# Patient Record
(demographics unavailable — no encounter records)

---

## 2024-10-28 NOTE — ASSESSMENT
[FreeTextEntry1] : 10/28/2024: R SH x-rays, 2 views, reveal negative Underlying pathology reviewed and treatment options discussed. Obtain MRI R SH R/O RTC tear Activity modification as tolerated. Questions addressed. Follow up after MRI.   The documentation recorded by the scribe accurately reflects the service I personally performed and the decisions made by me. I, Urszula Oliveros, attest that this documentation has been prepared under the direction and in the presence of Provider Shaka Collins MD.   The patient was seen by Shaka Collins MD.

## 2024-10-28 NOTE — HISTORY OF PRESENT ILLNESS
[Sudden] : sudden [8] : 8 [1] : 2 [Localized] : localized [Rest] : rest [Retired] : Work status: retired [de-identified] : 10/28/2024: RHD male presents with right shoulder pain that started on 10/25/24. He was doing a bench press when the shoulder "gave out." Notes persistent anterior/lateral shoulder pain with limited ROM. Difficulty lifting above the shoulder or reaching. He has been icing and taking Aleve.  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

## 2024-10-28 NOTE — PHYSICAL EXAM
[Right] : right shoulder [4 ___] : forward flexion 4[unfilled]/5 [5___] : internal rotation 5[unfilled]/5 [] : no sensory deficits [TWNoteComboBox7] : active forward flexion 160 degrees [TWNoteComboBox6] : internal rotation L3 [de-identified] : external rotation 80 degrees

## 2024-11-04 NOTE — DATA REVIEWED
[MRI] : MRI [Right] : of the right [Shoulder] : shoulder [Report was reviewed and noted in the chart] : The report was reviewed and noted in the chart [I independently reviewed and interpreted images and report] : I independently reviewed and interpreted images and report [FreeTextEntry1] : OC MRI R  10/28/24: Mild progression of supraspinatus partial tearing and delamination and mild new partial tearing of the infraspinatus tendon insertion anteriorly without tendon retraction or muscle atrophy. Similar partial tearing of the subscapularis tendon insertion, superior and anterior labral tearing, effusion, synovitis and capsulitis and moderate AC joint arthrosis with moderate subacromial bursitis. No acute fx or disproportionate muscle atrophy.

## 2024-11-04 NOTE — ASSESSMENT
[FreeTextEntry1] : 10/28/2024: R SH x-rays, 2 views, reveal negative Underlying pathology reviewed and treatment options discussed. Obtain MRI R SH R/O RTC tear Activity modification as tolerated. Questions addressed. Follow up after MRI.   11/04/2024: MRI reviewed with patient. Underlying pathology reviewed and treatment options discussed. Start PT and HEP to reduce pain. Activity modification as tolerated. We discussed risk and benefits of CSI. Patient elected to proceed with steroid injection today - R SH tolerated well. Ice if sore. Questions addressed. Follow up prn.  The documentation recorded by the scribe accurately reflects the service I personally performed and the decisions made by me. I, Urszula Oliveros, attest that this documentation has been prepared under the direction and in the presence of Provider Shaka Collins MD.   The patient was seen by Shaka Collins MD.

## 2024-11-04 NOTE — PHYSICAL EXAM
[4 ___] : forward flexion 4[unfilled]/5 [Right] : right shoulder [5 ___] : forward flexion 5[unfilled]/5 [5___] : internal rotation 5[unfilled]/5 [] : no sensory deficits [TWNoteComboBox6] : internal rotation L3 [de-identified] : external rotation 80 degrees [TWNoteComboBox7] : active forward flexion 160 degrees

## 2024-11-04 NOTE — HISTORY OF PRESENT ILLNESS
[Sudden] : sudden [8] : 8 [1] : 2 [Localized] : localized [Rest] : rest [Retired] : Work status: retired [de-identified] : 11/04/2024: Follow up left shoulder. Here to review MRI. Symptoms continue. He also notes worsening pain in the right shoulder, as well as intermittent numbness in the hands when sleeping.  10/28/2024: RHD male presents with right shoulder pain that started on 10/25/24. He was doing a bench press when the shoulder "gave out." Notes persistent anterior/lateral shoulder pain with limited ROM. Difficulty lifting above the shoulder or reaching. He has been icing and taking Aleve.  [] : no [FreeTextEntry1] : rt shoulder [FreeTextEntry3] : 10-25-24 [FreeTextEntry5] : felt shoulder give out while bench pressing [FreeTextEntry6] : sore [FreeTextEntry9] : Aleve [de-identified] : lifting arm,reaching [de-identified] : MRI [facility]

## 2024-11-04 NOTE — PROCEDURE
[FreeTextEntry3] :  A Large joint injection was performed of the [RIGHT SUBACROMIAL SPACE]. The indication for this procedure was pain and inflammation, and patient had decreased mobility in the joint. Risks, benefits and alternatives to a steroid injection procedure were discussed; Risks outlined include but are not limited to infection, sepsis, bleeding, scarring, skin discoloration, temporary increase in pain, syncopal episode, failure to resolve symptoms, allergic reaction, symptom recurrence, and elevation of blood sugar in diabetics.. All questions were answered to the patient's apparent satisfaction and informed consent obtained. Prior to injection a 'Time Out' was conducted in accordance with [facility] policy and the site and nature of procedure verified with the patient.    Procedure: The area of injection was prepared in a sterile fashion. The injection and aspiration was carried out utilizing sterile technique. The site was prepped with alcohol, betadine, ethyl chloride sprayed topically and sterile technique used.   (X) 1cc of Celestone(30mg/ml)  (X) 2cc of 1% Lidocaine   Patient tolerated the procedure well and direct pressure was applied for hemostasis. The patient was reminded of potential post-injection risks including, but not limited to, delayed hypersensitivity reactions and/or infection. Ice tonight to the injection site.

## 2024-12-02 NOTE — ASSESSMENT
[FreeTextEntry1] : 04/08/2024: MRI reviewed and discussed.  Pathology and treated options reviewed. Pain on the medial aspect of the right knee, pain tolerable but right knee scope may be needed in the future.  Continue OTC NSAIDs. Activity modifications reviewed. Right knee cortisone injection performed.  Follow up after 6 weeks if pain persists.  Questions answered.  05/20/2024: Pt with some mild OA and meniscus tear. Treatment options reviewed. Discussed  visco injection and patient elects to proceed. Will request auth for right knee Euflexxa. Start PT and HEP to improve mechanics and reduce pain. Discussed activity modifications.  Return when approved.  06/03/2024: Right knee euflexxa #1- tolerated well.  Ice if sore. RTO in 1 week to continue.  06/10/2024: Right knee euflexxa #2 tolerated well.  Ice if sore. RTO in 1 week to continue.  06/17/2024: Right knee euflexxa #3 tolerated well.  Ice if sore. RTO in 6 weeks if symptoms persist.   12/02/2024: Treatment options reviewed. Discussed repeat CSI vs visco injections and patient elects to proceed with visco. Will request auth for right knee Euflexxa. He can return to begin after 12/18/24. Questions answered.  Progress note completed by GARRY Cavanaugh under the direct supervision of Shaka Collins M.D.

## 2024-12-02 NOTE — HISTORY OF PRESENT ILLNESS
[Gradual] : gradual [2] : 2 [1] : 2 [Dull/Aching] : dull/aching [Localized] : localized [Intermittent] : intermittent [Household chores] : household chores [Leisure] : leisure [Sleep] : sleep [Social interactions] : social interactions [Injection therapy] : injection therapy [Walking] : walking [Stairs] : stairs [Retired] : Work status: retired [de-identified] : 12/02/2024: Follow up right knee. He had good relief following visco, but symptoms have started to return after helping someone move this past week. He has been using ice/Aleve.  06/17/2024: Right knee Euflexxa #3. Symptoms continue. Denies complication with prior injection.  06/10/2024: Right knee Euflexxa #2. Symptoms continue. Denies complication with prior injection.  06/03/2024: Here for right knee euflexxa #1. He has started PT.  05/20/2024: Follow up right knee. He had good relief with CSI, but still with sig stiffness. Ambulates without assistance.   04/08/2024: Follow up right knee MRI results. Patient denies clicking and catching of the knee, and notes pain on the medial aspect of the right knee.   04/01/2024: 61 y/o male presents with right knee pain that started while riding a bike 3 weeks ago in [place]. Describes medial knee pain that is worse with activity. Difficulty with stairs and rising from a seated position. He has been taking Aleve with little relief. Denies history of prior injury. [] : no [FreeTextEntry1] : rt knee [FreeTextEntry5] : pain for a week, was bike riding while on vacation [de-identified] : getting up, over use

## 2024-12-02 NOTE — PHYSICAL EXAM
[Right] : right knee [NL (0)] : extension 0 degrees [5___] : hamstring 5[unfilled]/5 [Equivocal] : equivocal iLly [] : no erythema [TWNoteComboBox7] : flexion 130 degrees

## 2024-12-02 NOTE — PHYSICAL EXAM
Rx Refill Note  Requested Prescriptions     Pending Prescriptions Disp Refills   • tiZANidine (ZANAFLEX) 4 MG tablet 30 tablet 0     Sig: Take 1 tablet by mouth Every 6 (Six) Hours As Needed for Muscle Spasms.      Last office visit with prescribing clinician: 4/30/2021      Next office visit with prescribing clinician: Visit date not found            Fartun eKndrick, PCT  08/12/22, 14:56 CDT   [Right] : right knee [NL (0)] : extension 0 degrees [5___] : hamstring 5[unfilled]/5 [Equivocal] : equivocal Lily [] : no erythema [TWNoteComboBox7] : flexion 130 degrees

## 2024-12-02 NOTE — HISTORY OF PRESENT ILLNESS
[Gradual] : gradual [2] : 2 [1] : 2 [Dull/Aching] : dull/aching [Localized] : localized [Intermittent] : intermittent [Household chores] : household chores [Leisure] : leisure [Sleep] : sleep [Social interactions] : social interactions [Injection therapy] : injection therapy [Walking] : walking [Stairs] : stairs [Retired] : Work status: retired [de-identified] : 12/02/2024: Follow up right knee. He had good relief following visco, but symptoms have started to return after helping someone move this past week. He has been using ice/Aleve.  06/17/2024: Right knee Euflexxa #3. Symptoms continue. Denies complication with prior injection.  06/10/2024: Right knee Euflexxa #2. Symptoms continue. Denies complication with prior injection.  06/03/2024: Here for right knee euflexxa #1. He has started PT.  05/20/2024: Follow up right knee. He had good relief with CSI, but still with sig stiffness. Ambulates without assistance.   04/08/2024: Follow up right knee MRI results. Patient denies clicking and catching of the knee, and notes pain on the medial aspect of the right knee.   04/01/2024: 61 y/o male presents with right knee pain that started while riding a bike 3 weeks ago in [place]. Describes medial knee pain that is worse with activity. Difficulty with stairs and rising from a seated position. He has been taking Aleve with little relief. Denies history of prior injury. [] : no [FreeTextEntry1] : rt knee [FreeTextEntry5] : pain for a week, was bike riding while on vacation [de-identified] : getting up, over use

## 2024-12-19 NOTE — DISCUSSION/SUMMARY
[de-identified] : (Impression) -right knee osteoarthritis  The diagnosis was explained in detail. The potential non-surgical and surgical treatments were reviewed. The relative risks and benefits of each option were considered relative to the patient age, activity level, medical history, symptom severity and previously attempted treatments.  The patient was advised to consult with their primary medical provider prior to initiation of any new medications to reduce the risk of adverse effects specific to their long-term home medications and medical history. The risk of gastrointestinal irritation and kidney injury specific to long-term NSAID use was discussed.    (Plan)  -Hyaluronic acid injection will be performed for symptom relief. The patient has severe degenerative joint disease that has not improved with multiple non-surgical modalities. -Over the counter NSAID for pain and inflammation, take as needed. -Follow up in 1 week for next injection.      (MDM) Problem Complexity -Moderate: chronic illness with exacerbation   Risk -Moderate: injection   Visit Type -Established     (Procedure: Hyaluronic Acid Injection)   Injection location was the: -right knee joint   Injection contents were: -Euflexxa, 2 mL   Procedure Details: -Informed consent was obtained. Discussed possible risks of hyaluronic acid injection including infection and local inflammatory reaction. -Discussed that due to infection risk, an interval between injection and any future surgery is 6 weeks for arthroscopy and 3 months for arthroplasty. -Injection performed using aseptic technique. Hemostasis was confirmed. -Procedure was tolerated well with no complications.

## 2024-12-19 NOTE — IMAGING
[de-identified] : (Exam: Knee)   Laterality is right    Patient is in no acute distress, alert and oriented Sensation is grossly intact to light touch in the foot Motor function is 5/5 in the foot Capillary refill is less than 2 seconds in the toes Lymphadenopathy is not present Peripheral edema is not present   Skin is intact Effusion is trace Atrophy is not present Antalgic gait is present   Medial joint line tenderness is present Lateral joint line tenderness is present Patellar tenderness is present Calf tenderness is not present   Knee extension is 3 Knee flexion is 120   Quadriceps strength is 5/5 Hamstring strength is 5/5   Lachman is normal Posterior drawer is normal Varus stress stability is normal Valgus stress stability is normal   Medial Idania test is abnormal Lateral Idania test is abnormal Patellofemoral grind test is abnormal Patellar apprehension test is normal [Right] : right knee [All Views] : anteroposterior, lateral, skyline, and anteroposterior standing [Mild tricompartmental OA medial narrowing] : Mild tricompartmental OA medial narrowing

## 2024-12-19 NOTE — HISTORY OF PRESENT ILLNESS
[de-identified] : Date of initial evaluation: 12/19/2024 Patient age: 61 year Body part causing symptoms: RT knee Location of the pain: medial Pain score today: 5/10 Duration of symptoms: years History of injury: none Activities that worsen the pain: walking, stairs Radicular symptoms: none Medications attempted for this problem: OTC PT for this problem: none recent Injections for this problem: none recent

## 2024-12-26 NOTE — DISCUSSION/SUMMARY
[de-identified] : (Impression) -right knee osteoarthritis  The diagnosis was explained in detail. The potential non-surgical and surgical treatments were reviewed. The relative risks and benefits of each option were considered relative to the patient age, activity level, medical history, symptom severity and previously attempted treatments.  The patient was advised to consult with their primary medical provider prior to initiation of any new medications to reduce the risk of adverse effects specific to their long-term home medications and medical history. The risk of gastrointestinal irritation and kidney injury specific to long-term NSAID use was discussed.    (Plan)  -Hyaluronic acid injection will be performed for symptom relief. The patient has severe degenerative joint disease that has not improved with multiple non-surgical modalities. -Over the counter NSAID for pain and inflammation, take as needed. -Follow up in 1 week for final injection.      (MDM) Problem Complexity -Moderate: chronic illness with exacerbation   Risk -Moderate: injection   Visit Type -Established     (Procedure: Hyaluronic Acid Injection)   Injection location was the: -right knee joint   Injection contents were: -Euflexxa, 2 mL   Procedure Details: -Informed consent was obtained. Discussed possible risks of hyaluronic acid injection including infection and local inflammatory reaction. -Discussed that due to infection risk, an interval between injection and any future surgery is 6 weeks for arthroscopy and 3 months for arthroplasty. -Injection performed using aseptic technique. Hemostasis was confirmed. -Procedure was tolerated well with no complications.

## 2024-12-26 NOTE — HISTORY OF PRESENT ILLNESS
[de-identified] : 12/26/2024: f/u for right knee, Euflexxa #2.  no relief from first injection.   Date of initial evaluation: 12/19/2024 Patient age: 61 year Body part causing symptoms: RT knee Location of the pain: medial Pain score today: 5/10 Duration of symptoms: years History of injury: none Activities that worsen the pain: walking, stairs Radicular symptoms: none Medications attempted for this problem: OTC PT for this problem: none recent Injections for this problem: none recent

## 2024-12-26 NOTE — IMAGING
[Right] : right knee [All Views] : anteroposterior, lateral, skyline, and anteroposterior standing [Mild tricompartmental OA medial narrowing] : Mild tricompartmental OA medial narrowing [de-identified] : (Exam: Knee)   Laterality is right    Patient is in no acute distress, alert and oriented Sensation is grossly intact to light touch in the foot Motor function is 5/5 in the foot Capillary refill is less than 2 seconds in the toes Lymphadenopathy is not present Peripheral edema is not present   Skin is intact Effusion is trace Atrophy is not present Antalgic gait is present   Medial joint line tenderness is present Lateral joint line tenderness is present Patellar tenderness is present Calf tenderness is not present   Knee extension is 3 Knee flexion is 120   Quadriceps strength is 5/5 Hamstring strength is 5/5   Lachman is normal Posterior drawer is normal Varus stress stability is normal Valgus stress stability is normal   Medial Idania test is abnormal Lateral Idania test is abnormal Patellofemoral grind test is abnormal Patellar apprehension test is normal

## 2025-01-02 NOTE — DISCUSSION/SUMMARY
[de-identified] : (Impression) -right knee osteoarthritis  The diagnosis was explained in detail. The potential non-surgical and surgical treatments were reviewed. The relative risks and benefits of each option were considered relative to the patient age, activity level, medical history, symptom severity and previously attempted treatments.  The patient was advised to consult with their primary medical provider prior to initiation of any new medications to reduce the risk of adverse effects specific to their long-term home medications and medical history. The risk of gastrointestinal irritation and kidney injury specific to long-term NSAID use was discussed.    (Plan)  -Hyaluronic acid injection will be performed for symptom relief. The patient has severe degenerative joint disease that has not improved with multiple non-surgical modalities. -Over the counter NSAID for pain and inflammation, take as needed. -Follow up as needed.      (MDM) Problem Complexity -Moderate: chronic illness with exacerbation   Risk -Moderate: injection   Visit Type -Established     (Procedure: Hyaluronic Acid Injection)   Injection location was the: -right knee joint   Injection contents were: -Euflexxa, 2 mL   Procedure Details: -Informed consent was obtained. Discussed possible risks of hyaluronic acid injection including infection and local inflammatory reaction. -Discussed that due to infection risk, an interval between injection and any future surgery is 6 weeks for arthroscopy and 3 months for arthroplasty. -Injection performed using aseptic technique. Hemostasis was confirmed. -Procedure was tolerated well with no complications.

## 2025-01-02 NOTE — IMAGING
[Right] : right knee [All Views] : anteroposterior, lateral, skyline, and anteroposterior standing [Mild tricompartmental OA medial narrowing] : Mild tricompartmental OA medial narrowing [de-identified] : (Exam: Knee)   Laterality is right    Patient is in no acute distress, alert and oriented Sensation is grossly intact to light touch in the foot Motor function is 5/5 in the foot Capillary refill is less than 2 seconds in the toes Lymphadenopathy is not present Peripheral edema is not present   Skin is intact Effusion is trace Atrophy is not present Antalgic gait is present   Medial joint line tenderness is present Lateral joint line tenderness is present Patellar tenderness is present Calf tenderness is not present   Knee extension is 3 Knee flexion is 120   Quadriceps strength is 5/5 Hamstring strength is 5/5   Lachman is normal Posterior drawer is normal Varus stress stability is normal Valgus stress stability is normal   Medial Idania test is abnormal Lateral Idania test is abnormal Patellofemoral grind test is abnormal Patellar apprehension test is normal

## 2025-01-02 NOTE — HISTORY OF PRESENT ILLNESS
[de-identified] : 01/02/2025: f/u for right knee, Euflexxa #3.  minimal relief from first two injections.   12/26/2024: f/u for right knee, Euflexxa #2.  no relief from first injection.   Date of initial evaluation: 12/19/2024 Patient age: 61 year Body part causing symptoms: RT knee Location of the pain: medial Pain score today: 5/10 Duration of symptoms: years History of injury: none Activities that worsen the pain: walking, stairs Radicular symptoms: none Medications attempted for this problem: OTC PT for this problem: none recent Injections for this problem: none recent

## 2025-06-02 NOTE — HISTORY OF PRESENT ILLNESS
[Gradual] : gradual [2] : 2 [0] : 0 [Dull/Aching] : dull/aching [Localized] : localized [Intermittent] : intermittent [Household chores] : household chores [Leisure] : leisure [Sleep] : sleep [Social interactions] : social interactions [Injection therapy] : injection therapy [Walking] : walking [Stairs] : stairs [Retired] : Work status: retired [de-identified] : 06/02/2025: Patient presents with worsening right knee pain. No new injury. Notes pain with prolonged driving. He did have significant relief with visco injections (completed 1/2/25).  12/02/2024: Follow up right knee. He had good relief following visco, but symptoms have started to return after helping someone move this past week. He has been using ice/Aleve.  06/17/2024: Right knee Euflexxa #3. Symptoms continue. Denies complication with prior injection.  06/10/2024: Right knee Euflexxa #2. Symptoms continue. Denies complication with prior injection.  06/03/2024: Here for right knee euflexxa #1. He has started PT.  05/20/2024: Follow up right knee. He had good relief with CSI, but still with sig stiffness. Ambulates without assistance.   04/08/2024: Follow up right knee MRI results. Patient denies clicking and catching of the knee, and notes pain on the medial aspect of the right knee.   04/01/2024: 61 y/o male presents with right knee pain that started while riding a bike 3 weeks ago in [place]. Describes medial knee pain that is worse with activity. Difficulty with stairs and rising from a seated position. He has been taking Aleve with little relief. Denies history of prior injury. [] : no [FreeTextEntry1] : rt knee [FreeTextEntry5] : pain for a week, was bike riding while on vacation [de-identified] : getting up, over use,driving

## 2025-06-02 NOTE — ASSESSMENT
[FreeTextEntry1] : 04/08/2024: MRI reviewed and discussed.  Pathology and treated options reviewed. Pain on the medial aspect of the right knee, pain tolerable but right knee scope may be needed in the future.  Continue OTC NSAIDs. Activity modifications reviewed. Right knee cortisone injection performed.  Follow up after 6 weeks if pain persists.  Questions answered.  05/20/2024: Pt with some mild OA and meniscus tear. Treatment options reviewed. Discussed  visco injection and patient elects to proceed. Will request auth for right knee Euflexxa. Start PT and HEP to improve mechanics and reduce pain. Discussed activity modifications.  Return when approved.  06/03/2024: Right knee euflexxa #1- tolerated well.  Ice if sore. RTO in 1 week to continue.  06/10/2024: Right knee euflexxa #2 tolerated well.  Ice if sore. RTO in 1 week to continue.  06/17/2024: Right knee euflexxa #3 tolerated well.  Ice if sore. RTO in 6 weeks if symptoms persist.   12/02/2024: Treatment options reviewed. Discussed repeat CSI vs visco injections and patient elects to proceed with visco. Will request auth for right knee Euflexxa. He can return to begin after 12/18/24. Questions answered.  06/02/2025: Treatment options reviewed. He had good relief previously with visco, so repeat recommended. Patient elects to proceed. Will submit auth for right knee Euflexxa. Return when approved. Questions answered.  Progress note completed by GARRY Cavanaugh under the direct supervision of Shaka Collins M.D.

## 2025-06-02 NOTE — PHYSICAL EXAM
[Right] : right knee [NL (0)] : extension 0 degrees [5___] : hamstring 5[unfilled]/5 [Equivocal] : equivocal Lily [] : no erythema [TWNoteComboBox7] : flexion 130 degrees